# Patient Record
Sex: MALE | Race: WHITE
[De-identification: names, ages, dates, MRNs, and addresses within clinical notes are randomized per-mention and may not be internally consistent; named-entity substitution may affect disease eponyms.]

---

## 2018-12-19 ENCOUNTER — HOSPITAL ENCOUNTER (EMERGENCY)
Dept: HOSPITAL 72 - EMR | Age: 58
Discharge: HOME | End: 2018-12-19
Payer: COMMERCIAL

## 2018-12-19 VITALS — SYSTOLIC BLOOD PRESSURE: 136 MMHG | DIASTOLIC BLOOD PRESSURE: 78 MMHG

## 2018-12-19 VITALS — DIASTOLIC BLOOD PRESSURE: 78 MMHG | SYSTOLIC BLOOD PRESSURE: 136 MMHG

## 2018-12-19 VITALS — BODY MASS INDEX: 25.76 KG/M2 | HEIGHT: 68 IN | WEIGHT: 170 LBS

## 2018-12-19 DIAGNOSIS — G20: ICD-10-CM

## 2018-12-19 DIAGNOSIS — F41.9: Primary | ICD-10-CM

## 2018-12-19 DIAGNOSIS — M62.838: ICD-10-CM

## 2018-12-19 LAB
ADD MANUAL DIFF: NO
ALBUMIN SERPL-MCNC: 3.6 G/DL (ref 3.4–5)
ALBUMIN/GLOB SERPL: 0.9 {RATIO} (ref 1–2.7)
ALP SERPL-CCNC: 69 U/L (ref 46–116)
ALT SERPL-CCNC: 18 U/L (ref 12–78)
ANION GAP SERPL CALC-SCNC: 9 MMOL/L (ref 5–15)
APPEARANCE UR: CLEAR
APTT PPP: YELLOW S
AST SERPL-CCNC: 6 U/L (ref 15–37)
BASOPHILS NFR BLD AUTO: 1 % (ref 0–2)
BILIRUB SERPL-MCNC: 0.5 MG/DL (ref 0.2–1)
BUN SERPL-MCNC: 11 MG/DL (ref 7–18)
CALCIUM SERPL-MCNC: 9 MG/DL (ref 8.5–10.1)
CHLORIDE SERPL-SCNC: 100 MMOL/L (ref 98–107)
CO2 SERPL-SCNC: 25 MMOL/L (ref 21–32)
CREAT SERPL-MCNC: 0.8 MG/DL (ref 0.55–1.3)
EOSINOPHIL NFR BLD AUTO: 0.7 % (ref 0–3)
ERYTHROCYTE [DISTWIDTH] IN BLOOD BY AUTOMATED COUNT: 13.7 % (ref 11.6–14.8)
GLOBULIN SER-MCNC: 3.8 G/DL
GLUCOSE UR STRIP-MCNC: NEGATIVE MG/DL
HCT VFR BLD CALC: 32.3 % (ref 42–52)
HGB BLD-MCNC: 10.4 G/DL (ref 14.2–18)
KETONES UR QL STRIP: (no result)
LEUKOCYTE ESTERASE UR QL STRIP: NEGATIVE
LYMPHOCYTES NFR BLD AUTO: 30.2 % (ref 20–45)
MCV RBC AUTO: 83 FL (ref 80–99)
MONOCYTES NFR BLD AUTO: 8.9 % (ref 1–10)
NEUTROPHILS NFR BLD AUTO: 59.2 % (ref 45–75)
NITRITE UR QL STRIP: NEGATIVE
PH UR STRIP: 5 [PH] (ref 4.5–8)
PLATELET # BLD: 264 K/UL (ref 150–450)
POTASSIUM SERPL-SCNC: 4.3 MMOL/L (ref 3.5–5.1)
PROT UR QL STRIP: NEGATIVE
RBC # BLD AUTO: 3.86 M/UL (ref 4.7–6.1)
SODIUM SERPL-SCNC: 134 MMOL/L (ref 136–145)
SP GR UR STRIP: 1.02 (ref 1–1.03)
UROBILINOGEN UR-MCNC: NORMAL MG/DL (ref 0–1)
WBC # BLD AUTO: 5.3 K/UL (ref 4.8–10.8)

## 2018-12-19 PROCEDURE — 96372 THER/PROPH/DIAG INJ SC/IM: CPT

## 2018-12-19 PROCEDURE — 85025 COMPLETE CBC W/AUTO DIFF WBC: CPT

## 2018-12-19 PROCEDURE — 96374 THER/PROPH/DIAG INJ IV PUSH: CPT

## 2018-12-19 PROCEDURE — 99284 EMERGENCY DEPT VISIT MOD MDM: CPT

## 2018-12-19 PROCEDURE — 81003 URINALYSIS AUTO W/O SCOPE: CPT

## 2018-12-19 PROCEDURE — 36415 COLL VENOUS BLD VENIPUNCTURE: CPT

## 2018-12-19 PROCEDURE — 96361 HYDRATE IV INFUSION ADD-ON: CPT

## 2018-12-19 PROCEDURE — 96375 TX/PRO/DX INJ NEW DRUG ADDON: CPT

## 2018-12-19 PROCEDURE — 80053 COMPREHEN METABOLIC PANEL: CPT

## 2018-12-19 PROCEDURE — 83690 ASSAY OF LIPASE: CPT

## 2018-12-19 PROCEDURE — 83735 ASSAY OF MAGNESIUM: CPT

## 2018-12-19 NOTE — EMERGENCY ROOM REPORT
History of Present Illness


General


Chief Complaint:  General Complaint


Source:  Patient





Present Illness


HPI


Patient is a 58-year-old male who presented after increased muscle spasming.  

Patient prior history of anxiety as well as Parkinson's disease.  Patient had 

previously been noted to have the multiple surgeries in the past.  Patient 

reports having increased spasming to his neck as well as to his upper 

extremities.  Patient prior cervical surgery.  The patient had last surgery 

approximately 8 months ago.  He had no recent trauma.  Patient not been having 

any fever.


Allergies:  


Coded Allergies:  


     NO KNOWN ALLERGIES (Verified  Allergy, Unknown, 9/1/18)





Patient History


Past Medical History:  see triage record


Reviewed Nursing Documentation:  PMH: Agreed; PSxH: Agreed





Nursing Documentation-PMH


Hx Cardiac Problems:  No


Hx Cancer:  No


Hx Gastrointestinal Problems:  No


Hx Neurological Problems:  Yes


Hx Parkinson's Disease:  Yes





Review of Systems


All Other Systems:  negative except mentioned in HPI





Physical Exam





Vital Signs








  Date Time  Temp Pulse Resp B/P (MAP) Pulse Ox O2 Delivery O2 Flow Rate FiO2


 


12/19/18 13:51 98.6 84 16 136/78 96 Room Air  








Sp02 EP Interpretation:  reviewed, normal


General Appearance:  normal inspection, alert, GCS 15, Chronically Ill


Head:  atraumatic


ENT:  normal ENT inspection, hearing grossly normal, normal voice


Neck:  normal inspection, full range of motion, supple, no bony tend


Respiratory:  normal inspection, lungs clear, normal breath sounds, no 

respiratory distress, no retraction, no wheezing


Cardiovascular #1:  regular rate, rhythm, no edema


Gastrointestinal:  normal inspection, normal bowel sounds, non tender, soft, no 

guarding, no hernia


Genitourinary:  no CVA tenderness


Musculoskeletal:  normal inspection, back normal, normal range of motion


Neurologic:  normal inspection, alert, responsive, speech normal


Psychiatric:  normal inspection, judgement/insight normal, mood/affect normal


Skin:  normal inspection, normal color, no rash





Medical Decision Making


Diagnostic Impression:  


 Primary Impression:  


 Anxiety


 Additional Impression:  


 Muscle spasm


ER Course


Patient presented for anxiety and muscle spasms.  Differential diagnosis 

include was not limited to anxiety, medication withdrawal, spinal cord injury, 

myocardial injury among others.  Because of complexity of patient's case 

laboratory tests  were ordered..  Patient was noted to have prior history of 

anxiety.  He was given IM Ativan for muscle spasming.  Patient was noted to 

have some improvement after IV hydration. Patient's labs showed some 

hypomagnesemia which was given IV  correction.  The patient was advised to 

follow-up with his neurosurgeon as well as his mental health provider.  Patient 

does not appear to be in any acute difficulty at this time.  Patient appears to 

have some chronic anxiety.





Labs








Test


  12/19/18


14:45 12/19/18


15:30 12/19/18


16:49


 


Sodium Level


  134 MMOL/L


(136-145) 


  


 


 


Potassium Level


  4.3 MMOL/L


(3.5-5.1) 


  


 


 


Chloride Level


  100 MMOL/L


() 


  


 


 


Carbon Dioxide Level


  25 MMOL/L


(21-32) 


  


 


 


Anion Gap


  9 mmol/L


(5-15) 


  


 


 


Blood Urea Nitrogen


  11 mg/dL


(7-18) 


  


 


 


Creatinine


  0.8 MG/DL


(0.55-1.30) 


  


 


 


Estimat Glomerular Filtration


Rate > 60 mL/min


(>60) 


  


 


 


Glucose Level


  111 MG/DL


() 


  


 


 


Calcium Level


  9.0 MG/DL


(8.5-10.1) 


  


 


 


Magnesium Level


  1.7 MG/DL


(1.8-2.4) 


  


 


 


Total Bilirubin


  0.5 MG/DL


(0.2-1.0) 


  


 


 


Aspartate Amino Transf


(AST/SGOT) 6 U/L (15-37) 


  


  


 


 


Alanine Aminotransferase


(ALT/SGPT) 18 U/L (12-78) 


  


  


 


 


Alkaline Phosphatase


  69 U/L


() 


  


 


 


Total Protein


  7.4 G/DL


(6.4-8.2) 


  


 


 


Albumin


  3.6 G/DL


(3.4-5.0) 


  


 


 


Globulin 3.8 g/dL   


 


Albumin/Globulin Ratio 0.9 (1.0-2.7)   


 


Lipase


  99 U/L


() 


  


 


 


White Blood Count


  


  5.3 K/UL


(4.8-10.8) 


 


 


Red Blood Count


  


  3.86 M/UL


(4.70-6.10) 


 


 


Hemoglobin


  


  10.4 G/DL


(14.2-18.0) 


 


 


Hematocrit


  


  32.3 %


(42.0-52.0) 


 


 


Mean Corpuscular Volume  83 FL (80-99)  


 


Mean Corpuscular Hemoglobin


  


  27.0 PG


(27.0-31.0) 


 


 


Mean Corpuscular Hemoglobin


Concent 


  32.3 G/DL


(32.0-36.0) 


 


 


Red Cell Distribution Width


  


  13.7 %


(11.6-14.8) 


 


 


Platelet Count


  


  264 K/UL


(150-450) 


 


 


Mean Platelet Volume


  


  5.8 FL


(6.5-10.1) 


 


 


Neutrophils (%) (Auto)


  


  59.2 %


(45.0-75.0) 


 


 


Lymphocytes (%) (Auto)


  


  30.2 %


(20.0-45.0) 


 


 


Monocytes (%) (Auto)


  


  8.9 %


(1.0-10.0) 


 


 


Eosinophils (%) (Auto)


  


  0.7 %


(0.0-3.0) 


 


 


Basophils (%) (Auto)


  


  1.0 %


(0.0-2.0) 


 


 


Urine Color   Yellow 


 


Urine Appearance   Clear 


 


Urine pH   5 (4.5-8.0) 


 


Urine Specific Gravity


  


  


  1.020


(1.005-1.035)


 


Urine Protein


  


  


  Negative


(NEGATIVE)


 


Urine Glucose (UA)


  


  


  Negative


(NEGATIVE)


 


Urine Ketones   2+ (NEGATIVE) 


 


Urine Blood


  


  


  Negative


(NEGATIVE)


 


Urine Nitrite


  


  


  Negative


(NEGATIVE)


 


Urine Bilirubin


  


  


  Negative


(NEGATIVE)


 


Urine Urobilinogen


  


  


  Normal MG/DL


(0.0-1.0)


 


Urine Leukocyte Esterase


  


  


  Negative


(NEGATIVE)











Last Vital Signs








  Date Time  Temp Pulse Resp B/P (MAP) Pulse Ox O2 Delivery O2 Flow Rate FiO2


 


12/19/18 13:51 98.6 84 16 136/78 96 Room Air  








Status:  improved


Disposition:  HOME, SELF-CARE


Condition:  Stable


Scripts


Lorazepam* (ATIVAN*) 1 Mg Tablet


1 MG ORAL THREE TIMES A DAY, #10 TAB


   Prov: Bryan Walter MD         12/19/18











Bryan Walter MD Dec 19, 2018 14:10